# Patient Record
Sex: FEMALE | URBAN - METROPOLITAN AREA
[De-identification: names, ages, dates, MRNs, and addresses within clinical notes are randomized per-mention and may not be internally consistent; named-entity substitution may affect disease eponyms.]

---

## 2022-01-04 ENCOUNTER — EMERGENCY (EMERGENCY)
Facility: HOSPITAL | Age: 22
LOS: 0 days | Discharge: HOME | End: 2022-01-04
Attending: STUDENT IN AN ORGANIZED HEALTH CARE EDUCATION/TRAINING PROGRAM
Payer: MEDICAID

## 2022-01-04 VITALS — WEIGHT: 218.04 LBS | HEIGHT: 67 IN

## 2022-01-04 VITALS
HEART RATE: 84 BPM | OXYGEN SATURATION: 98 % | SYSTOLIC BLOOD PRESSURE: 110 MMHG | RESPIRATION RATE: 18 BRPM | TEMPERATURE: 99 F | DIASTOLIC BLOOD PRESSURE: 66 MMHG
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
## 2022-01-04 DIAGNOSIS — S05.02XA INJURY OF CONJUNCTIVA AND CORNEAL ABRASION WITHOUT FOREIGN BODY, LEFT EYE, INITIAL ENCOUNTER: ICD-10-CM

## 2022-01-04 DIAGNOSIS — S05.92XA UNSPECIFIED INJURY OF LEFT EYE AND ORBIT, INITIAL ENCOUNTER: ICD-10-CM

## 2022-01-04 PROCEDURE — 99283 EMERGENCY DEPT VISIT LOW MDM: CPT

## 2022-01-04 RX ORDER — CIPROFLOXACIN HCL 0.3 %
1 DROPS OPHTHALMIC (EYE) ONCE
Refills: 0 | Status: COMPLETED | OUTPATIENT
Start: 2022-01-04 | End: 2022-01-04

## 2022-01-04 RX ORDER — FLUORESCEIN SODIUM 9 MG
1 STRIP OPHTHALMIC (EYE) ONCE
Refills: 0 | Status: COMPLETED | OUTPATIENT
Start: 2022-01-04 | End: 2022-01-04

## 2022-01-04 RX ADMIN — Medication 1 DROP(S): at 08:21

## 2022-01-04 RX ADMIN — Medication 1 APPLICATION(S): at 08:21

## 2022-01-04 NOTE — ED PROVIDER NOTE - PATIENT PORTAL LINK FT
You can access the FollowMyHealth Patient Portal offered by [facility] by registering at the following website: [email or web address]. By joining [email or web address]’s FollowMyHealth portal, you will also be able to view your health information using other applications (apps) compatible with our system.

## 2022-01-04 NOTE — ED PROVIDER NOTE - CLINICAL SUMMARY MEDICAL DECISION MAKING FREE TEXT BOX
22 yo female with left eye pain; concern for corneal abrasion; VA; wood's lamp/fluorescin statin, re-eval

## 2022-01-04 NOTE — ED ADULT NURSE REASSESSMENT NOTE - NS ED NURSE REASSESS COMMENT FT1
visual acuity: left eye 20/100, right eye 20/40, bilateral eyes 20/40; patient somewhat [id number] during exam

## 2022-01-04 NOTE — ED PROVIDER NOTE - OBJECTIVE STATEMENT
Patient is a 20 yo female who is covid positive as of 12/30/2021; patient reports that she was playing with her brother last night and poked into left eye; reports pain to left eye; no blurred vision or double vision; no other trauma or injury; no blood thinners; took tylenol prior to arrival.

## 2022-01-04 NOTE — ED ADULT NURSE NOTE - OBJECTIVE STATEMENT
patient ambulatory into ed co left eye pain sp being poked in eye. eye is red, increased lacrimation; patient refusing to open eye. visual acuity tested, see nurse reassessment noted authored by authoring RN. patient somewhat uncooperative during eye exam as she was continuously closing eye even when directed not to do so. pt able to ambulate on her own without assistance safely. medicated by md harrington.

## 2022-01-04 NOTE — ED PROVIDER NOTE - NSFOLLOWUPINSTRUCTIONS_ED_ALL_ED_FT
Corneal Abrasion     WHAT YOU NEED TO KNOW:    A corneal abrasion is a scratch on the cornea of your eye. The cornea is the clear layer that covers the front of your eye. A small scratch may heal in 1 to 2 days. Deeper or larger scratches may take longer to heal. Eye Anatomy         DISCHARGE INSTRUCTIONS:    Call your healthcare provider or ophthalmologist if:     Your eye pain or vision gets worse.      You have yellow or green drainage from your eye.      You have questions or concerns about your condition or care.    Medicines:     Medicines may be given in the form of eyedrops or ointment to help prevent an eye infection. You may also be given eyedrops to decrease pain.      Take your medicine as directed. Contact your healthcare provider if you think your medicine is not helping or if you have side effects. Tell him or her if you are allergic to any medicine. Keep a list of the medicines, vitamins, and herbs you take. Include the amounts, and when and why you take them. Bring the list or the pill bottles to follow-up visits. Carry your medicine list with you in case of an emergency.    Care for your eyes:     Get regular eye exams. Get your eyes checked at least every year.      Eat healthy foods. Fresh fruits and vegetables that are rich in vitamins A and C may help with your vision. Foods such as sweet potatoes, apricots, and carrots are rich in good nutrients for the eyes.      Take care of your contacts or glasses. Store, clean, and use your contacts or glasses as directed. Replace your glasses or contact lenses as often as your healthcare provider suggests.      Decrease eye strain. Rest your eyes, especially after you read or sew for long periods of time. Get plenty of sleep at night. Use lights that reduce glare in your home, school, or workplace.      Wear dark sunglasses. This will help prevent pain and light sensitivity. Make sure the sunglasses have UVA and UVB protection. This will protect your eyes when you go outside.      Use eyedrops safely. If your treatment plan includes eyedrops, it is important to use them as directed. Your provider may give you detailed instructions to follow. The eyedrops may also come with safety instructions. Follow all instructions to help prevent an infection. Do not touch the tip of the bottle to your eye. Germs from your eye can spread to the medicine bottle.Steps 1 2 3 4         Help prevent corneal abrasions:     Remove your contact lenses if your eyes feel dry or irritated.      Wash your hands if you need to touch your eyes or your face.      Trim your child's fingernails so he or she cannot scratch his or her eye.      Wear protective eyewear when you work with chemicals, wood, dust, or metal.      Wear protective eyewear when you play sports.      Do not wear your contacts for longer than you should.      Do not wear colored lenses or lenses with shapes on them. These lenses may cause eye damage and vision loss.      Do not wear glitter makeup. Glitter can easily get into your eyes and under contact lenses.      Do not sleep with your contacts in.    Follow up with your healthcare provider as directed: Write down your questions so you remember to ask them during your visits.    CILOXAN 0.3%- 2 drops to left eye four times a day for one week

## 2022-01-04 NOTE — ED PROVIDER NOTE - PROGRESS NOTE DETAILS
Katelyn DO: fluorescin uptake at 3oclock position; Ciloxan given (pulled from ED pharmacy)- 2 drops QID x 7 days; f/u with optho as instructed; VA prior to d/c home by nursing staff; strict return precautions given.

## 2022-01-04 NOTE — ED PROVIDER NOTE - EYES, MLM
Clear bilaterally, pupils equal, round and reactive to light; mild left conjunctival injection; (+) fluorescin uptake at 3oclock position;

## 2022-01-04 NOTE — ED ADULT TRIAGE NOTE - CHIEF COMPLAINT QUOTE
PT to ED with c/o left eye pain after a injury last night. PT reports being poked in the eye while playing with siblings. Tylenol administered by mother last night for pain. PT awoke with increased pain and redness to eye this morning. Denies vision changes. COVID + on 12/30/21

## 2023-03-24 ENCOUNTER — NON-APPOINTMENT (OUTPATIENT)
Age: 23
End: 2023-03-24

## 2024-10-17 ENCOUNTER — NON-APPOINTMENT (OUTPATIENT)
Age: 24
End: 2024-10-17

## 2025-07-02 ENCOUNTER — OFFICE (OUTPATIENT)
Dept: URBAN - METROPOLITAN AREA CLINIC 102 | Facility: CLINIC | Age: 25
Setting detail: OPHTHALMOLOGY
End: 2025-07-02
Payer: COMMERCIAL

## 2025-07-02 DIAGNOSIS — H16.223: ICD-10-CM

## 2025-07-02 PROCEDURE — 99203 OFFICE O/P NEW LOW 30 MIN: CPT | Performed by: OPHTHALMOLOGY

## 2025-07-02 ASSESSMENT — TONOMETRY
OS_IOP_MMHG: 20
OD_IOP_MMHG: 21

## 2025-07-02 ASSESSMENT — CONFRONTATIONAL VISUAL FIELD TEST (CVF)
OS_FINDINGS: FULL
OD_FINDINGS: FULL

## 2025-07-03 PROBLEM — H16.223 DRY EYE SYNDROME K SICCA; BOTH EYES: Status: ACTIVE | Noted: 2025-07-02

## 2025-07-03 ASSESSMENT — VISUAL ACUITY
OD_BCVA: 20/20
OS_BCVA: 20/20

## 2025-07-03 ASSESSMENT — SUPERFICIAL PUNCTATE KERATITIS (SPK)
OS_SPK: T
OD_SPK: T

## 2025-07-03 ASSESSMENT — KERATOMETRY
OD_K2POWER_DIOPTERS: 40.75
OS_K1POWER_DIOPTERS: 40.25
OD_AXISANGLE_DEGREES: 084
OS_AXISANGLE_DEGREES: 094
OD_K1POWER_DIOPTERS: 40.25
OS_K2POWER_DIOPTERS: 40.75

## 2025-07-03 ASSESSMENT — REFRACTION_AUTOREFRACTION
OD_AXIS: 141
OD_CYLINDER: -0.25
OS_CYLINDER: -0.50
OD_SPHERE: +0.25
OS_SPHERE: -0.25
OS_AXIS: 002

## 2025-07-03 ASSESSMENT — CORNEAL TRAUMA - ABRASION: OS_ABRASION: ABSENT

## 2025-07-03 ASSESSMENT — CORNEAL TRAUMA - FOREIGN BODY: OS_FOREIGNBODY: ABSENT
